# Patient Record
Sex: MALE | Employment: OTHER | URBAN - METROPOLITAN AREA
[De-identification: names, ages, dates, MRNs, and addresses within clinical notes are randomized per-mention and may not be internally consistent; named-entity substitution may affect disease eponyms.]

---

## 2017-08-07 ENCOUNTER — APPOINTMENT (OUTPATIENT)
Dept: GENERAL RADIOLOGY | Age: 72
End: 2017-08-07
Attending: EMERGENCY MEDICINE
Payer: MEDICARE

## 2017-08-07 ENCOUNTER — HOSPITAL ENCOUNTER (EMERGENCY)
Age: 72
Discharge: HOME | End: 2017-08-07
Attending: EMERGENCY MEDICINE
Payer: MEDICARE

## 2017-08-07 VITALS
TEMPERATURE: 97.8 F | HEART RATE: 99 BPM | WEIGHT: 235 LBS | DIASTOLIC BLOOD PRESSURE: 92 MMHG | OXYGEN SATURATION: 93 % | RESPIRATION RATE: 22 BRPM | HEIGHT: 71 IN | BODY MASS INDEX: 32.9 KG/M2 | SYSTOLIC BLOOD PRESSURE: 134 MMHG

## 2017-08-07 DIAGNOSIS — I50.9 ACUTE ON CHRONIC CONGESTIVE HEART FAILURE, UNSPECIFIED CONGESTIVE HEART FAILURE TYPE: Primary | ICD-10-CM

## 2017-08-07 DIAGNOSIS — R06.00 DYSPNEA, UNSPECIFIED TYPE: ICD-10-CM

## 2017-08-07 LAB
ALBUMIN SERPL BCP-MCNC: 4 G/DL (ref 3.4–5)
ALBUMIN/GLOB SERPL: 1.2 {RATIO} (ref 0.8–1.7)
ALP SERPL-CCNC: 64 U/L (ref 45–117)
ALT SERPL-CCNC: 70 U/L (ref 16–61)
ANION GAP BLD CALC-SCNC: 18 MMOL/L (ref 10–20)
AST SERPL W P-5'-P-CCNC: 32 U/L (ref 15–37)
BASOPHILS # BLD AUTO: 0.1 K/UL (ref 0–0.06)
BASOPHILS # BLD: 1 % (ref 0–2)
BILIRUB DIRECT SERPL-MCNC: 0.2 MG/DL (ref 0–0.2)
BILIRUB SERPL-MCNC: 0.7 MG/DL (ref 0.2–1)
BNP SERPL-MCNC: 2740 PG/ML (ref 0–900)
BUN BLD-MCNC: 17 MG/DL (ref 7–18)
CA-I BLD-MCNC: 1.2 MMOL/L (ref 1.12–1.32)
CHLORIDE BLD-SCNC: 103 MMOL/L (ref 100–108)
CO2 BLD-SCNC: 25 MMOL/L (ref 19–24)
CREAT UR-MCNC: 1.1 MG/DL (ref 0.6–1.3)
D DIMER PPP FEU-MCNC: 0.27 UG/ML(FEU)
DIFFERENTIAL METHOD BLD: ABNORMAL
EOSINOPHIL # BLD: 0.5 K/UL (ref 0–0.4)
EOSINOPHIL NFR BLD: 4 % (ref 0–5)
ERYTHROCYTE [DISTWIDTH] IN BLOOD BY AUTOMATED COUNT: 13.8 % (ref 11.6–14.5)
GLOBULIN SER CALC-MCNC: 3.4 G/DL (ref 2–4)
GLUCOSE BLD STRIP.AUTO-MCNC: 195 MG/DL (ref 74–106)
HCT VFR BLD AUTO: 47.6 % (ref 36–48)
HCT VFR BLD CALC: 50 % (ref 36–49)
HGB BLD-MCNC: 15.3 G/DL (ref 13–16)
HGB BLD-MCNC: 17 G/DL (ref 12–16)
INR PPP: 1.8 (ref 0.8–1.2)
LYMPHOCYTES # BLD AUTO: 15 % (ref 21–52)
LYMPHOCYTES # BLD: 1.8 K/UL (ref 0.9–3.6)
MAGNESIUM SERPL-MCNC: 2.2 MG/DL (ref 1.6–2.6)
MCH RBC QN AUTO: 28.7 PG (ref 24–34)
MCHC RBC AUTO-ENTMCNC: 32.1 G/DL (ref 31–37)
MCV RBC AUTO: 89.1 FL (ref 74–97)
MONOCYTES # BLD: 1.1 K/UL (ref 0.05–1.2)
MONOCYTES NFR BLD AUTO: 9 % (ref 3–10)
NEUTS SEG # BLD: 8.3 K/UL (ref 1.8–8)
NEUTS SEG NFR BLD AUTO: 71 % (ref 40–73)
PLATELET # BLD AUTO: 220 K/UL (ref 135–420)
PMV BLD AUTO: 12.1 FL (ref 9.2–11.8)
POTASSIUM BLD-SCNC: 3.6 MMOL/L (ref 3.5–5.5)
PROT SERPL-MCNC: 7.4 G/DL (ref 6.4–8.2)
PROTHROMBIN TIME: 20.3 SEC (ref 11.5–15.2)
RBC # BLD AUTO: 5.34 M/UL (ref 4.7–5.5)
SODIUM BLD-SCNC: 141 MMOL/L (ref 136–145)
TROPONIN I BLD-MCNC: <0.04 NG/ML (ref 0–0.08)
TROPONIN I BLD-MCNC: <0.04 NG/ML (ref 0–0.08)
WBC # BLD AUTO: 11.6 K/UL (ref 4.6–13.2)

## 2017-08-07 PROCEDURE — 71010 XR CHEST SNGL V: CPT

## 2017-08-07 PROCEDURE — 83880 ASSAY OF NATRIURETIC PEPTIDE: CPT | Performed by: EMERGENCY MEDICINE

## 2017-08-07 PROCEDURE — 80047 BASIC METABLC PNL IONIZED CA: CPT

## 2017-08-07 PROCEDURE — 85379 FIBRIN DEGRADATION QUANT: CPT | Performed by: EMERGENCY MEDICINE

## 2017-08-07 PROCEDURE — 85025 COMPLETE CBC W/AUTO DIFF WBC: CPT | Performed by: EMERGENCY MEDICINE

## 2017-08-07 PROCEDURE — 84484 ASSAY OF TROPONIN QUANT: CPT

## 2017-08-07 PROCEDURE — 85610 PROTHROMBIN TIME: CPT | Performed by: EMERGENCY MEDICINE

## 2017-08-07 PROCEDURE — 96374 THER/PROPH/DIAG INJ IV PUSH: CPT

## 2017-08-07 PROCEDURE — 83735 ASSAY OF MAGNESIUM: CPT | Performed by: EMERGENCY MEDICINE

## 2017-08-07 PROCEDURE — 99285 EMERGENCY DEPT VISIT HI MDM: CPT

## 2017-08-07 PROCEDURE — 74011250636 HC RX REV CODE- 250/636: Performed by: EMERGENCY MEDICINE

## 2017-08-07 PROCEDURE — 80076 HEPATIC FUNCTION PANEL: CPT | Performed by: EMERGENCY MEDICINE

## 2017-08-07 PROCEDURE — 93005 ELECTROCARDIOGRAM TRACING: CPT

## 2017-08-07 RX ORDER — FUROSEMIDE 10 MG/ML
20 INJECTION INTRAMUSCULAR; INTRAVENOUS
Status: COMPLETED | OUTPATIENT
Start: 2017-08-07 | End: 2017-08-07

## 2017-08-07 RX ORDER — METOPROLOL TARTRATE 5 MG/5ML
2.5 INJECTION INTRAVENOUS ONCE
Status: DISCONTINUED | OUTPATIENT
Start: 2017-08-07 | End: 2017-08-07

## 2017-08-07 RX ADMIN — FUROSEMIDE 20 MG: 10 INJECTION, SOLUTION INTRAVENOUS at 17:41

## 2017-08-07 NOTE — ED NOTES
Hour nursing rounds performed. Pt resting in bed with no signs of distress noted. Pt denied pain, SOB, distress, and or any discomfort at this time. Ensured pt's BP/ SPO2 and cardiac monitoring in proper place and on. Will continue to monitor pt.

## 2017-08-07 NOTE — ED NOTES
Krystle Anne is a 73 y.o. male that was discharged in stable condition. The patients diagnosis, condition and treatment were explained to  patient and aftercare instructions were given. The patient verbalized understanding. Patient armband removed and shredded.

## 2017-08-07 NOTE — ED TRIAGE NOTES
C/o pt states that he believes he is in congestive heart failure. Pt states that he does have afib and CHF.

## 2017-08-07 NOTE — ED PROVIDER NOTES
HPI Comments: 3:56 PM Angel Ragsdale is a 73 y.o. male with a history of CHF, DM, arthritis, and A-fib who presents to ED for the evaluation of SOB that he began this morning. Pt states that he has been panting PTA and is a little hoarse. He explains previously going on vacation doing excessive moving and running. Pt says that during his vacation he missed a few days of taking his furosemide and gained weight. He also says that he is always in A-fib. Pt denies CP. No other complaints, associated symptoms or modifying factors at this time. PCP: Lobo Brito MD      The history is provided by the patient. No  was used. Past Medical History:   Diagnosis Date    A-fib [facility])     Arthritis     Cancer ([facility] [address] 2011    Neck Lymphnodes removed    CHF [facility]congestive heart failure) [facility] [address]     DM (diabetes mellitus) ([facility] [address]     Hyperlipidemia        Past Surgical History:   Procedure Laterality Date    HX ORTHOPAEDIC      Lumbar spine    HX ORTHOPAEDIC      Rt Knee    HX OTHER SURGICAL      Lymphnode removal to neck         No family history on file. Social History     Social History    Marital status:      Spouse name: N/A    Number of children: N/A    Years of education: N/A     Occupational History    Not on file. Social History Main Topics    Smoking status: Not on file    Smokeless tobacco: Not on file    Alcohol use Not on file    Drug use: Not on file    Sexual activity: Not on file     Other Topics Concern    Not on file     Social History Narrative    No narrative on file         ALLERGIES: Review of patient's allergies indicates no known allergies. Review of Systems   Respiratory: Positive for shortness of breath. \"hoarse\"   Cardiovascular: Negative for chest pain. All other systems reviewed and are negative.       Vitals:    08/07/17 1547 08/07/17 1553   BP:  (!) 132/113   Pulse: (!) 105 (!) 106   Resp: 20 29   Temp:  97.8 °F (36.6 °C) SpO2: 96%    Weight:  106.6 kg (235 lb)   Height:  5' 11\" (1.803 m)            Physical Exam   Constitutional: He is oriented to person, place, and time. He appears well-developed and well-nourished. HENT:   Head: Normocephalic and atraumatic. Mouth/Throat: Oropharynx is clear and moist.   Eyes: Conjunctivae are normal. Pupils are equal, round, and reactive to light. No scleral icterus. Neck: Normal range of motion. Neck supple. No JVD present. No tracheal deviation present. Cardiovascular: Normal heart sounds. An irregularly irregular rhythm present. Tachycardia present. No murmur   Pulmonary/Chest: Effort normal and breath sounds normal. No respiratory distress. He has no wheezes. Abdominal: Soft. Bowel sounds are normal.   Musculoskeletal: Normal range of motion. 1+ pitting edema to bilateral ankles   Neurological: He is alert and oriented to person, place, and time. He has normal strength. Gait normal. GCS eye subscore is 4. GCS verbal subscore is 5. GCS motor subscore is 6. Skin: Skin is warm and dry. Psychiatric: He has a normal mood and affect. Nursing note and vitals reviewed. MDM  Number of Diagnoses or Management Options    ED Course     Patient presenting with dyspnea and edema, consistent with mild CHF exacerbation. Likely due to med and dietary noncompliance while on vacation. Oxygenating well on RA and ambulating without difficulty. No signs of pneumonia, ACS, or PE; trpn and d-dimer negative. Will continue his current med regimen and f/u with primary care and cardiology. States he is chronically in a fib.     Procedures      Vitals:  Patient Vitals for the past 12 hrs:   Temp Pulse Resp BP SpO2   08/07/17 1605 - - - - 97 %   08/07/17 1600 - 99 21 115/83 96 %   08/07/17 1553 97.8 °F (36.6 °C) (!) 106 29 (!) 132/113 -   08/07/17 1549 - (!) 102 19 132/82 98 %   08/07/17 1547 - (!) 105 20 - 96 %         Medications ordered:   Medications   metoprolol (LOPRESSOR) injection 2.5 mg (not administered)         Lab findings:  Recent Results (from the past 12 hour(s))   EKG, 12 LEAD, INITIAL    Collection Time: 08/07/17  3:51 PM   Result Value Ref Range    Ventricular Rate 116 BPM    Atrial Rate 89 BPM    QRS Duration 98 ms    Q-T Interval 332 ms    QTC Calculation (Bezet) 461 ms    Calculated R Axis 3 degrees    Calculated T Axis 65 degrees    Diagnosis       Atrial fibrillation with rapid ventricular response  Nonspecific ST and T wave abnormality , probably digitalis effect  Abnormal ECG  No previous ECGs available         EKG interpretation by ED Physician:  Atrial fib rate 116, no acute ST changes    X-Ray, CT or other radiology findings or impressions:  No results found. Progress notes, Consult notes or additional Procedure notes:   6:22 PM Consult:  Discussed care with Dr. Kristin Chaney discussion; including history of patients chief complaint, available diagnostic results, and treatment course. Discussed presentation and results agrees with plan for outpatient and continuing current medication regimen. Disposition:  Diagnosis: No diagnosis found. Disposition: Discharged     Follow-up Information     None           Patient's Medications   Start Taking    No medications on file   Continue Taking    CARVEDILOL (COREG) 25 MG TABLET    Take 25 mg by mouth two (2) times daily (with meals). CYANOCOBALAMIN (VITAMIN B-12) 1,000 MCG TABLET    Take 1,000 mcg by mouth daily. FUROSEMIDE (LASIX) 20 MG TABLET    Take  by mouth daily. HYDROXYCHLOROQUINE (PLAQUENIL) 200 MG TABLET    Take 200 mg by mouth daily. SITAGLIPTIN-METFORMIN (JANUMET)  MG PER TABLET    Take 1 Tab by mouth two (2) times daily (with meals). WARFARIN (COUMADIN) 5 MG TABLET    Take 5 mg by mouth daily. These Medications have changed    No medications on file   Stop Taking    ATORVASTATIN (LIPITOR) 20 MG TABLET    Take  by mouth daily.     HYDROCODONE-ACETAMINOPHEN (NORCO) 5-325 MG PER TABLET Take 1 Tab by mouth every four (4) hours as needed for Pain. Scribe Attestation:   Anette Apodaca acting as a scribe for and in the presence of Perlita Rascon MD August 07, 2017 at 4:12 PM     Signed by: Favio Hilario, August 07, 2017 at 4:12 PM     Provider Attestation:   I personally performed the services described in the documentation, reviewed the documentation, as recorded by the scribe in my presence, and it accurately and completely records my words and actions.      Reviewed and signed by:  Perlita Rascon MD

## 2017-08-07 NOTE — ED NOTES
Per Dr. Bar Kemp, hold the metoprolol at this time, if heart rate climbs to 110-120 give the Metoprolol. Pt's nurse notified at this time.

## 2017-08-07 NOTE — DISCHARGE INSTRUCTIONS
Continue your medications as prescribed. If you have worsening symptoms, trouble breathing, passing out, chest pain or any other worrying signs then return to the ER right away. Heart Failure: Care Instructions  Your Care Instructions    Heart failure occurs when your heart does not pump as much blood as the body needs. Failure does not mean that the heart has stopped pumping but rather that it is not pumping as well as it should. Over time, this causes fluid buildup in your lungs and other parts of your body. Fluid buildup can cause shortness of breath, fatigue, swollen ankles, and other problems. By taking medicines regularly, reducing sodium (salt) in your diet, checking your weight every day, and making lifestyle changes, you can feel better and live longer. Follow-up care is a key part of your treatment and safety. Be sure to make and go to all appointments, and call your doctor if you are having problems. It's also a good idea to know your test results and keep a list of the medicines you take. How can you care for yourself at home? Medicines  · Be safe with medicines. Take your medicines exactly as prescribed. Call your doctor if you think you are having a problem with your medicine. · Do not take any vitamins, over-the-counter medicine, or herbal products without talking to your doctor first. Mila Merigold not take ibuprofen (Advil or Motrin) and naproxen (Aleve) without talking to your doctor first. They could make your heart failure worse. · You may be taking some of the following medicine. ¨ Beta-blockers can slow heart rate, decrease blood pressure, and improve your condition. Taking a beta-blocker may lower your chance of needing to be hospitalized. ¨ Angiotensin-converting enzyme inhibitors (ACEIs) reduce the heart's workload, lower blood pressure, and reduce swelling. Taking an ACEI may lower your chance of needing to be hospitalized again. ¨ Angiotensin II receptor blockers (ARBs) work like ACEIs. Your doctor may prescribe them instead of ACEIs. ¨ Diuretics, also called water pills, reduce swelling. ¨ Potassium supplements replace this important mineral, which is sometimes lost with diuretics. ¨ Aspirin and other blood thinners prevent blood clots, which can cause a stroke or heart attack. You will get more details on the specific medicines your doctor prescribes. Diet  · Your doctor may suggest that you limit sodium to 2,000 milligrams (mg) a day or less. That is less than 1 teaspoon of salt a day, including all the salt you eat in cooking or in packaged foods. People get most of their sodium from processed foods. Fast food and restaurant meals also tend to be very high in sodium. · Ask your doctor how much liquid you can drink each day. You may have to limit liquids. Weight  · Weigh yourself without clothing at the same time each day. Record your weight. Call your doctor if you have a sudden weight gain, such as more than 2 to 3 pounds in a day or 5 pounds in a week. (Your doctor may suggest a different range of weight gain.) A sudden weight gain may mean that your heart failure is getting worse. Activity level  · Start light exercise (if your doctor says it is okay). Even if you can only do a small amount, exercise will help you get stronger, have more energy, and manage your weight and your stress. Walking is an easy way to get exercise. Start out by walking a little more than you did before. Bit by bit, increase the amount you walk. · When you exercise, watch for signs that your heart is working too hard. You are pushing yourself too hard if you cannot talk while you are exercising. If you become short of breath or dizzy or have chest pain, stop, sit down, and rest.  · If you feel \"wiped out\" the day after you exercise, walk slower or for a shorter distance until you can work up to a better pace. · Get enough rest at night.  Sleeping with 1 or 2 pillows under your upper body and head may help you breathe easier. Lifestyle changes  · Do not smoke. Smoking can make a heart condition worse. If you need help quitting, talk to your doctor about stop-smoking programs and medicines. These can increase your chances of quitting for good. Quitting smoking may be the most important step you can take to protect your heart. · Limit alcohol to 2 drinks a day for men and 1 drink a day for women. Too much alcohol can cause health problems. · Avoid getting sick from colds and the flu. Get a pneumococcal vaccine shot. If you have had one before, ask your doctor whether you need another dose. Get a flu shot each year. If you must be around people with colds or the flu, wash your hands often. When should you call for help? Call 911 if you have symptoms of sudden heart failure such as:  · You have severe trouble breathing. · You cough up pink, foamy mucus. · You have a new irregular or rapid heartbeat. Call your doctor now or seek immediate medical care if:  · You have new or increased shortness of breath. · You are dizzy or lightheaded, or you feel like you may faint. · You have sudden weight gain, such as more than 2 to 3 pounds in a day or 5 pounds in a week. (Your doctor may suggest a different range of weight gain.)  · You have increased swelling in your legs, ankles, or feet. · You are suddenly so tired or weak that you cannot do your usual activities. Watch closely for changes in your health, and be sure to contact your doctor if:  · You develop new symptoms. Where can you learn more? Go to http://sylvia-naima.info/. Enter J416 in the search box to learn more about \"Heart Failure: Care Instructions. \"  Current as of: April 3, 2017  Content Version: 11.3  © 9971-5797 [employer]. Care instructions adapted under license by [employer] (which disclaims liability or warranty for this information).  If you have questions about a medical condition or this instruction, always ask your healthcare professional. [place] [address] any warranty or liability for your use of this information.

## 2017-08-08 LAB
ATRIAL RATE: 89 BPM
CALCULATED R AXIS, ECG10: 3 DEGREES
CALCULATED T AXIS, ECG11: 65 DEGREES
DIAGNOSIS, 93000: NORMAL
Q-T INTERVAL, ECG07: 332 MS
QRS DURATION, ECG06: 98 MS
QTC CALCULATION (BEZET), ECG08: 461 MS
VENTRICULAR RATE, ECG03: 116 BPM

## 2019-08-23 ENCOUNTER — OFFICE VISIT (OUTPATIENT)
Dept: ORTHOPEDIC SURGERY | Age: 74
End: 2019-08-23

## 2019-08-23 VITALS
DIASTOLIC BLOOD PRESSURE: 81 MMHG | BODY MASS INDEX: 29.93 KG/M2 | SYSTOLIC BLOOD PRESSURE: 134 MMHG | WEIGHT: 214.6 LBS | RESPIRATION RATE: 15 BRPM | HEART RATE: 59 BPM

## 2019-08-23 DIAGNOSIS — S39.012A BACK STRAIN, INITIAL ENCOUNTER: Primary | ICD-10-CM

## 2019-08-23 DIAGNOSIS — M47.816 SPONDYLOSIS OF LUMBAR REGION WITHOUT MYELOPATHY OR RADICULOPATHY: ICD-10-CM

## 2019-08-23 RX ORDER — CYCLOBENZAPRINE HCL 5 MG
5-10 TABLET ORAL
Qty: 30 TAB | Refills: 1 | Status: SHIPPED | OUTPATIENT
Start: 2019-08-23 | End: 2021-04-27 | Stop reason: SDUPTHER

## 2019-08-23 RX ORDER — PREDNISONE 20 MG/1
TABLET ORAL
Qty: 20 TAB | Refills: 0 | Status: SHIPPED | OUTPATIENT
Start: 2019-08-23 | End: 2019-08-26 | Stop reason: SDUPTHER

## 2019-08-23 NOTE — PATIENT INSTRUCTIONS
Back Spasm: Care Instructions  Your Care Instructions  A back spasm is sudden tightness and pain in your back muscles. It may happen from overuse or an injury. Things like sleeping in an awkward way, bending, lifting, standing, or sitting can sometimes cause a spasm. But the cause isn't always clear. Home treatment includes using heat or ice, taking over-the-counter (OTC) pain medicines, and avoiding activities that may cause back pain. For a back spasm that doesn't get better with home care, your doctor may prescribe medicine. Treatments such as massage or manipulation may also help ease a back spasm. Your doctor may also suggest exercise or physical therapy to help improve strength and flexibility in your back muscles. In most cases, getting back to your normal activities is good for your back. Just make sure to avoid doing things that make your pain worse. Follow-up care is a key part of your treatment and safety. Be sure to make and go to all appointments, and call your doctor if you are having problems. It's also a good idea to know your test results and keep a list of the medicines you take. How can you care for yourself at home?   Heat, ice, and medicines    · To relieve pain, use heat or ice (whichever feels better) on the affected area. ? Put a warm water bottle, a heating pad set on low, or a warm cloth on your back. Put a thin cloth between the heating pad and your skin. Do not go to sleep with a heating pad on your skin. ? Try ice or a cold pack on the area for 10 to 20 minutes at a time. Put a thin cloth between the ice and your skin.     · For most back pain you can take over-the-counter pain medicine. Nonsteroidal anti-inflammatory drugs (NSAIDs) such as ibuprofen or naproxen seem to work best. But if you can't take NSAIDs you can try acetaminophen. Your doctor can prescribe stronger medicines if needed. Be safe with medicines.  Read and follow all instructions on the label.   THE [facility] positions and posture    · Sit or lie in positions that are most comfortable for you and that reduce pain. Try one of these positions when you lie down:  ? Lie on your back with your knees bent and supported by large pillows. ? Lie on the floor with your legs on the seat of a sofa or chair. ? Lie on your side with your knees and hips bent and a pillow between your legs. ? Lie on your stomach if it does not make pain worse.     · Do not sit up in bed. Avoid soft couches and twisted positions.     · Avoid bed rest after the first day of back pain. Bed rest can help relieve pain at first, but it delays healing. Continued rest without activity is usually not good for your back.     · If you must sit for long periods of time, take breaks from sitting. Change positions every 30 minutes. Get up and walk around, or lie in a comfortable position. Activity    · Take short walks several times a day. You can start with 5 to 10 minutes, 3 or 4 times a day, and work up to longer walks. Walk on level surfaces and avoid hills and stairs until your back starts to feel better.     · After your back spasm starts to feel better, try to stretch your muscles every day, especially before and after exercise and at bedtime. Regular stretching can help relax your muscles.     · To prevent future back pain, do exercises to stretch and strengthen your back and stomach. Learn to use good posture, safe lifting techniques, and other ways to move to help you avoid back pain. When should you call for help? Call 911 anytime you think you may need emergency care. For example, call if:    · You are unable to move an arm or a leg at all.   [facility] your doctor now or seek immediate medical care if:    · You have new or worse symptoms in your legs, belly, or buttocks. Symptoms may include:  ? Numbness or tingling. ? Weakness.   ? Pain.     · You lose bladder or bowel control.    Watch closely for changes in your health, and be sure to contact your doctor if:    · You have a fever, lose weight, or don't feel well.     · You do not get better as expected. Where can you learn more? Go to http://sylvia-naima.info/. Enter E232 in the search box to learn more about \"Back Spasm: Care Instructions. \"  Current as of: September 20, 2018  Content Version: 12.1  © 2924-5808 [employer]. Care instructions adapted under license by [employer] (which disclaims liability or warranty for this information). If you have questions about a medical condition or this instruction, always ask your healthcare professional. [place] [address] any warranty or liability for your use of this information.

## 2019-08-23 NOTE — PROGRESS NOTES
[address], [address]  [place]  Phone: [phone]  Fax: [phone]  NEW PATIENT  Patient: Akbar Jenkins                MRN: [id number]       SSN: [id number]  YOB: 1945        AGE: 69 y.o. SEX: male  Body mass index is 29.93 kg/m². PCP: Uma Ku MD  08/23/19    Chief Complaint   Patient presents with    Back Pain     NEW PATIENT     Akbar Jenkins is seen today in consultation at the request of his PCP for complaints of Back pain     HISTORY OF PRESENT ILLNESS:  Akbar Jenkins is a 69 y.o.  male with history of back pain after doing some heavy activity in his attic about 6 months ago. He reports muscle spasms. He has no radicular symptoms. Prior history of back problems: previous spinal surgery - for a HNP when he was 27 yrs old, he sought chiropractic care many years ago w/ benefit. After this recent episode he went to his PCP, he was given some Celebrex w/ benefit but it put him back in A-fib and he had to stop it. His pain is left-sided and arthritic in nature, worse in the morning. He has tried; PT-No,  Non-opioid medications Yes, and spinal injections No. Pain is aching and throbbing. Pain is worse with lifting, twisting and affects recreational activities. Pain is better with ice. Denies bladder/bowel dysfunction, saddle paresthesia, weakness, gait disturbance, or other neurological deficits. Denies chills, fever,night sweats, unexplained weight loss/weight gain, chest pain, sob or anxiety. Pt at this time desires to  continue with current care/proceed with medication evaluation/proceed with evaluation of back pain. Medications Has tried Celebrex and Tylenol. Can't have NSAIDs. On Prednisone 5mg daily. PMHx: A-Fib on Eliquis, DM, Tongue Ca, RA    RADIOGRAPHS  2V LS reviewed  Advanced degenerative changes      ASSESSMENT   Diagnoses and all orders for this visit:    1.  Back strain, initial encounter    2. Spondylosis of lumbar region without myelopathy or radiculopathy    Other orders  -     predniSONE (DELTASONE) 20 mg tablet; Take 3 tabs po x 3 days, then 2 tabs po x 3 days, then 1 tabs po x 3 days, then 1/2 tab po x 4 day  -     cyclobenzaprine (FLEXERIL) 5 mg tablet; Take 1-2 Tabs by mouth three (3) times daily as needed for Muscle Spasm(s). IMPRESSION AND PLAN:  This is a pt with an acute back strain six months ago that has gotten better and some advanced degenerative changes in his LS. His pain has gotten better over time. He is not interested in PT, HEP or injections. I will give some steroids to try and calm his arthritis and a mild muscle relaxer for his pain.    > Pt was given information on Flexeril   > Prednisone pack  > Very PRN Flexeril  > Mr. Catrachito Dean has a reminder for a \"due or due soon\" health maintenance. I have asked that he contact his primary care provider, Kitty Figueroa MD, for follow-up on this health maintenance.  > We have informed patient to notify us for immediate appointment if he has any worsening neurogical symptoms or if an emergency situation presents, then call 911  >  has been reviewed and is appropriate  > Pt will follow-up in as needed. Subjective    Smoking Status former smoker    Pain Scale: 2/10    Pain Assessment  8/23/2019   Location of Pain Back   Severity of Pain 2   Quality of Pain Aching   Frequency of Pain Intermittent   Aggravating Factors (No Data)   Aggravating Factors Comment just depends   Relieving Factors Rest         REVIEW OF SYSTEMS  Constitutional: Negative for fever, chills, or weight change. Respiratory: Negative for cough or shortness of breath. Cardiovascular: Negative for chest pain or palpitations. Gastrointestinal: Negative for incontinence, acid reflux, change in bowel habits, or constipation. Genitourinary: Negative for incontinence, dysuria and flank pain. Musculoskeletal: Positive for back pain.  See HPI.  Skin: Negative for rash. Neurological:no  radiculopathy. See HPI. Endo/Heme/Allergies: Negative. Psychiatric/Behavioral: Negative. PHYSICAL EXAMINATION  Visit Vitals  /81 (BP 1 Location: Left arm, BP Patient Position: Sitting)   Pulse (!) 59   Resp 15   Wt 214 lb 9.6 oz (97.3 kg)   BMI 29.93 kg/m²         Accompanied by self. Constitutional:  Well developed, well nourished, in no acute distress. Psychiatric: Affect and mood are appropriate. Integumentary: No rashes or abrasions noted on exposed areas. Cardiovascular/Peripheral Vascular: +2 radial & pedal pulses. No peripheral edema is noted. Lymphatic:  No evidence of lymphedema. No cervical lymphadenopathy. SPINE/MUSCULOSKELETAL EXAM     Lumbar spine:  No rash, ecchymosis, or gross obliquity. No fasciculations. No focal atrophy is noted. Range of motion is intact with flexion, extension, turning right, turning left. Tenderness to palpation L low back L4. No tenderness to palpation at the sciatic notch. SI joints non-tender. Trochanters non tender. Straight leg raise Negative    Sensation grossly intact to light touch. MOTOR:     Hip Flex Quads Hamstrings Ankle DF EHL Ankle PF   Right 5/5 5/5 5/5 5/5 5/5 5/5   Left 5/5 5/5 5/5 5/5 5/5 5/5       Ambulation without assistive device. FWB.    normal gait and station        PAST MEDICAL HISTORY   Past Medical History:   Diagnosis Date    [facility] [address]     Arthritis     Cancer ([facility] [address] 2011    Neck Lymphnodes removed    [facility]estive [facility] failure) [facility] [address]     DM [facility]diabetes mellitus) ([facility] [address]     Hyperlipidemia        Past Surgical History:   Procedure Laterality Date    HX ORTHOPAEDIC      Lumbar spine    HX ORTHOPAEDIC      Rt Knee    HX OTHER SURGICAL      Lymphnode removal to neck   .       MEDICATIONS      Current Outpatient Medications   Medication Sig Dispense Refill    apixaban (ELIQUIS) 5 mg tablet Eliquis 5 mg tablet      SITagliptin (JANUVIA) 100 mg tablet Januvia 100 mg tablet      predniSONE (DELTASONE) 20 mg tablet Take 3 tabs po x 3 days, then 2 tabs po x 3 days, then 1 tabs po x 3 days, then 1/2 tab po x 4 day 20 Tab 0    cyclobenzaprine (FLEXERIL) 5 mg tablet Take 1-2 Tabs by mouth three (3) times daily as needed for Muscle Spasm(s). 30 Tab 1    carvedilol (COREG) 25 mg tablet Take 25 mg by mouth two (2) times daily (with meals).  cyanocobalamin (VITAMIN B-12) 1,000 mcg tablet Take 1,000 mcg by mouth daily.  hydroxychloroquine (PLAQUENIL) 200 mg tablet Take 200 mg by mouth daily.  furosemide (LASIX) 20 mg tablet Take  by mouth daily.  sitaGLIPtin-metFORMIN (JANUMET)  mg per tablet Take 1 Tab by mouth two (2) times daily (with meals).  warfarin (COUMADIN) 5 mg tablet Take 5 mg by mouth daily. ALLERGIES  No Known Allergies       SOCIAL HISTORY    Social History     Socioeconomic History    Marital status: UNKNOWN     Spouse name: Not on file    Number of children: Not on file    Years of education: Not on file    Highest education level: Not on file   Occupational History    Not on file   Social Needs    Financial resource strain: Not on file    Food insecurity:     Worry: Not on file     Inability: Not on file    Transportation needs:     Medical: Not on file     Non-medical: Not on file   Tobacco Use    Smoking status: Former Smoker    Smokeless tobacco: Never Used   Substance and Sexual Activity    Alcohol use:  Yes     Alcohol/week: 3.0 standard drinks     Types: 3 Glasses of wine per week     Comment: daily    Drug use: Not on file    Sexual activity: Not on file   Lifestyle    Physical activity:     Days per week: Not on file     Minutes per session: Not on file    Stress: Not on file   Relationships    Social connections:     Talks on phone: Not on file     Gets together: Not on file     Attends Jewish service: Not on file     Active member of club or organization: Not on file     Attends meetings of clubs or organizations: Not on file     Relationship status: Not on file    Intimate partner violence:     Fear of current or ex partner: Not on file     Emotionally abused: Not on file     Physically abused: Not on file     Forced sexual activity: Not on file   Other Topics Concern    Not on file   Social History Narrative    Not on file       FAMILY HISTORY  No family history on file.       Maria D Art, NP

## 2019-08-26 ENCOUNTER — TELEPHONE (OUTPATIENT)
Dept: ORTHOPEDIC SURGERY | Age: 74
End: 2019-08-26

## 2019-08-26 RX ORDER — PREDNISONE 20 MG/1
TABLET ORAL
Qty: 20 TAB | Refills: 0 | Status: SHIPPED | OUTPATIENT
Start: 2019-08-26 | End: 2021-04-27 | Stop reason: ALTCHOICE

## 2019-08-26 NOTE — TELEPHONE ENCOUNTER
Please call pharmacy to see if they received this. Per CC, it was signed. Please apologize to the patient on our behalf  .

## 2019-08-26 NOTE — TELEPHONE ENCOUNTER
Per [facility] Rx was printed at our [facility] location. Per NP elizabeth Wheatley to send medication electronically. Rx sent. Returned call to patient, verified Name/, informed patient of above. Patient verbalized agreement/understanding. No further action required at this time.

## 2019-08-26 NOTE — TELEPHONE ENCOUNTER
Patient seen Fri 08/23/2019 by Sunday Henao. The prednisone DELTASONE) was not called in to [facility]. Patient upset as he needed this Friday. Please call prescription asap this morning to [facility]

## 2020-02-17 ENCOUNTER — HOSPITAL ENCOUNTER (OUTPATIENT)
Dept: LAB | Age: 75
Discharge: HOME | End: 2020-02-17
Payer: MEDICARE

## 2020-02-17 DIAGNOSIS — Z01.818 OTHER SPECIFIED PRE-OPERATIVE EXAMINATION: ICD-10-CM

## 2020-02-17 LAB
ANION GAP SERPL CALC-SCNC: 7 MMOL/L (ref 3–18)
BUN SERPL-MCNC: 16 MG/DL (ref 7–18)
BUN/CREAT SERPL: 16 (ref 12–20)
CALCIUM SERPL-MCNC: 9 MG/DL (ref 8.5–10.1)
CHLORIDE SERPL-SCNC: 107 MMOL/L (ref 100–111)
CO2 SERPL-SCNC: 29 MMOL/L (ref 21–32)
CREAT SERPL-MCNC: 0.98 MG/DL (ref 0.6–1.3)
GLUCOSE SERPL-MCNC: 127 MG/DL (ref 74–99)
POTASSIUM SERPL-SCNC: 3.3 MMOL/L (ref 3.5–5.5)
SODIUM SERPL-SCNC: 143 MMOL/L (ref 136–145)

## 2020-02-17 PROCEDURE — 36415 COLL VENOUS BLD VENIPUNCTURE: CPT

## 2020-02-17 PROCEDURE — 80048 BASIC METABOLIC PNL TOTAL CA: CPT

## 2020-02-17 PROCEDURE — 93005 ELECTROCARDIOGRAM TRACING: CPT

## 2020-02-18 LAB
ATRIAL RATE: 60 BPM
CALCULATED P AXIS, ECG09: 49 DEGREES
CALCULATED R AXIS, ECG10: -4 DEGREES
CALCULATED T AXIS, ECG11: 7 DEGREES
DIAGNOSIS, 93000: NORMAL
P-R INTERVAL, ECG05: 178 MS
Q-T INTERVAL, ECG07: 410 MS
QRS DURATION, ECG06: 100 MS
QTC CALCULATION (BEZET), ECG08: 410 MS
VENTRICULAR RATE, ECG03: 60 BPM

## 2020-11-06 ENCOUNTER — HOSPITAL ENCOUNTER (OUTPATIENT)
Dept: CT IMAGING | Age: 75
Discharge: HOME | End: 2020-11-06
Attending: FAMILY MEDICINE
Payer: MEDICARE

## 2020-11-06 ENCOUNTER — TRANSCRIBE ORDER (OUTPATIENT)
Dept: SCHEDULING | Age: 75
End: 2020-11-06

## 2020-11-06 DIAGNOSIS — I63.9 CEREBRAL INFARCTION (HCC): Primary | ICD-10-CM

## 2020-11-06 DIAGNOSIS — I63.9 CEREBRAL INFARCTION (HCC): ICD-10-CM

## 2020-11-06 PROCEDURE — 70450 CT HEAD/BRAIN W/O DYE: CPT

## 2021-04-27 ENCOUNTER — OFFICE VISIT (OUTPATIENT)
Dept: ORTHOPEDIC SURGERY | Age: 76
End: 2021-04-27
Payer: MEDICARE

## 2021-04-27 VITALS
WEIGHT: 195 LBS | TEMPERATURE: 98 F | HEART RATE: 73 BPM | HEIGHT: 71 IN | BODY MASS INDEX: 27.3 KG/M2 | OXYGEN SATURATION: 98 %

## 2021-04-27 DIAGNOSIS — M62.830 MUSCLE SPASM OF BACK: ICD-10-CM

## 2021-04-27 DIAGNOSIS — M47.816 SPONDYLOSIS OF LUMBAR REGION WITHOUT MYELOPATHY OR RADICULOPATHY: Primary | ICD-10-CM

## 2021-04-27 DIAGNOSIS — M54.6 CHRONIC RIGHT-SIDED THORACIC BACK PAIN: ICD-10-CM

## 2021-04-27 DIAGNOSIS — G89.29 CHRONIC RIGHT-SIDED THORACIC BACK PAIN: ICD-10-CM

## 2021-04-27 PROCEDURE — 3017F COLORECTAL CA SCREEN DOC REV: CPT | Performed by: NURSE PRACTITIONER

## 2021-04-27 PROCEDURE — G8419 CALC BMI OUT NRM PARAM NOF/U: HCPCS | Performed by: NURSE PRACTITIONER

## 2021-04-27 PROCEDURE — G8432 DEP SCR NOT DOC, RNG: HCPCS | Performed by: NURSE PRACTITIONER

## 2021-04-27 PROCEDURE — 99213 OFFICE O/P EST LOW 20 MIN: CPT | Performed by: NURSE PRACTITIONER

## 2021-04-27 PROCEDURE — G8427 DOCREV CUR MEDS BY ELIG CLIN: HCPCS | Performed by: NURSE PRACTITIONER

## 2021-04-27 PROCEDURE — G8536 NO DOC ELDER MAL SCRN: HCPCS | Performed by: NURSE PRACTITIONER

## 2021-04-27 PROCEDURE — 1101F PT FALLS ASSESS-DOCD LE1/YR: CPT | Performed by: NURSE PRACTITIONER

## 2021-04-27 RX ORDER — ACETAMINOPHEN 500 MG
1000 TABLET ORAL 3 TIMES DAILY
COMMUNITY

## 2021-04-27 RX ORDER — SENNOSIDES 8.6 MG/1
TABLET ORAL
COMMUNITY

## 2021-04-27 RX ORDER — ONDANSETRON 4 MG/1
TABLET, FILM COATED ORAL
COMMUNITY
Start: 2021-04-05

## 2021-04-27 RX ORDER — CETIRIZINE HCL 10 MG
10 TABLET ORAL AS NEEDED
COMMUNITY
Start: 2021-01-25

## 2021-04-27 RX ORDER — CYCLOBENZAPRINE HCL 5 MG
5-10 TABLET ORAL
Qty: 45 TAB | Refills: 1 | Status: SHIPPED | OUTPATIENT
Start: 2021-04-27

## 2021-04-27 RX ORDER — POLYETHYLENE GLYCOL 3350 17 G/17G
17 POWDER, FOR SOLUTION ORAL DAILY
COMMUNITY
Start: 2021-01-21

## 2021-04-27 RX ORDER — ASPIRIN 81 MG/1
TABLET ORAL
COMMUNITY
Start: 2020-07-16

## 2021-04-27 RX ORDER — OMEPRAZOLE 20 MG/1
20 CAPSULE, DELAYED RELEASE ORAL DAILY
COMMUNITY
Start: 2020-07-10

## 2021-04-27 RX ORDER — ATORVASTATIN CALCIUM 40 MG/1
TABLET, FILM COATED ORAL
COMMUNITY

## 2021-04-27 NOTE — PROGRESS NOTES
[address]  [address], [address]  [place] [address]  Phone: [phone]  Fax: [phone]    Bushra Reed  : 1945  PCP: Hardik Gipson MD    PROGRESS NOTE    HISTORY OF PRESENT ILLNESS:  Chief Complaint   Patient presents with    Back Pain     Stacy Freire is a 70 y.o.  male with history of back pain after doing some heavy activity in his attic about 2 years ago. He was seen last in 2019 with GENARO Wheatley as a new patient. Prior history of back problems: previous spinal surgery - for a HNP when he was 27 yrs old, he sought chiropractic care many years ago w/ benefit. After this recent episode he went to his PCP, he was given some Celebrex w/ benefit but it put him back in A-fib and he had to stop it. He was given a prednisone taper and PRN flexeril. Today, he states he is here for some pain in his left thoracic spine. He is describing a muscle spasms. He states it will radiate from his back and will come and go. He denies any burning pains. The only way to get rid of it is when he goes to sleep. Denies bladder/bowel dysfunction, saddle paresthesia, weakness, gait disturbance, or other neurological deficit. Pt at this time desires to  continue with current care/proceed with medication evaluation. Of note, he is undergoing chemo and radiation     Medications Has tried Celebrex and Tylenol. Can't have NSAIDs. On Prednisone 5mg daily.      PMHx: A-Fib on Eliquis, DM, Tongue Ca, RA     RADIOGRAPHS  2V LS reviewed  Advanced degenerative changes       ASSESSMENT  70 y.o. male with thoracic muscle pain. Diagnoses and all orders for this visit:    1. Spondylosis of lumbar region without myelopathy or radiculopathy  -     cyclobenzaprine (FLEXERIL) 5 mg tablet; Take 1-2 Tabs by mouth two (2) times daily as needed for Muscle Spasm(s). -     AMB SUPPLY ORDER  -     REFERRAL TO PHYSICAL THERAPY    2.  Chronic right-sided thoracic back pain  - cyclobenzaprine (FLEXERIL) 5 mg tablet; Take 1-2 Tabs by mouth two (2) times daily as needed for Muscle Spasm(s). -     AMB SUPPLY ORDER  -     REFERRAL TO PHYSICAL THERAPY    3. Muscle spasm of back  -     cyclobenzaprine (FLEXERIL) 5 mg tablet; Take 1-2 Tabs by mouth two (2) times daily as needed for Muscle Spasm(s). -     AMB SUPPLY ORDER  -     REFERRAL TO PHYSICAL THERAPY         IMPRESSION/PLAN    1) Pt was given information on back exercises. 2) referral to PT  3) trial of flexeril PRN, he has taken this in the past with benefit   4) ESTIM for muscle spasms  4) Mr. Danielle Ghotra has a reminder for a \"due or due soon\" health maintenance. I have asked that he contact his primary care provider, April Serra MD, for follow-up on this health maintenance. 5) We have informed patient to notify us for immediate appointment if he has any worsening neurogical symptoms or if an emergency situation presents, then call 911  5) Pt will follow-up in PRN. Risks and benefits of ongoing therapy have been reviewed with the patient.  is appropriate. No pain behaviors. Denies thoughts of harming self or others. Pt has a good risk to benefit ratio which allows the pt to function in a home environment without side effects. PAST MEDICAL HISTORY  Past Medical History:   Diagnosis Date    [facility] [address]     Arthritis     Cancer ([facility] [address] 2011    Neck Lymphnodes removed    CH[facility]congestive heart failure) ([facility] [address]     DM (diabetes mellitus) ([facility] [address]     Hyperlipidemia         MEDICATIONS  Current Outpatient Medications   Medication Sig Dispense Refill    acetaminophen (TYLENOL) 500 mg tablet Take 1,000 mg by mouth three (3) times daily.  aspirin delayed-release 81 mg tablet aspirin 81 mg tablet,delayed release   take 1 tablet by mouth once daily      atorvastatin (LIPITOR) 40 mg tablet atorvastatin 40 mg tablet   TAKE 1 TABLET BY MOUTH  EVERY DAY      cetirizine (ZYRTEC) 10 mg tablet Take 10 mg by mouth as needed.       omeprazole (PRILOSEC) 20 mg capsule Take 20 mg by mouth daily.  ondansetron hcl (ZOFRAN) 4 mg tablet take 1 tablet by mouth three to four times a day      polyethylene glycol (MIRALAX) 17 gram/dose powder Take 17 g by mouth daily.  senna (SENOKOT) 8.6 mg tablet Senna Lax 8.6 mg tablet   take 1 tablet by mouth twice a day if needed for constipation      cyclobenzaprine (FLEXERIL) 5 mg tablet Take 1-2 Tabs by mouth two (2) times daily as needed for Muscle Spasm(s). 45 Tab 1    apixaban (ELIQUIS) 5 mg tablet Take 5 mg by mouth two (2) times a day.  SITagliptin (JANUVIA) 100 mg tablet Take 100 mg by mouth daily.  carvedilol (COREG) 25 mg tablet Take 25 mg by mouth two (2) times daily (with meals).  cyanocobalamin (VITAMIN B-12) 1,000 mcg tablet Take 1,000 mcg by mouth daily.  hydroxychloroquine (PLAQUENIL) 200 mg tablet Take 200 mg by mouth two (2) times a day.  furosemide (LASIX) 20 mg tablet Take 20 mg by mouth daily.  warfarin (COUMADIN) 5 mg tablet Take 5 mg by mouth daily.  sitaGLIPtin-metFORMIN (JANUMET)  mg per tablet Take 1 Tab by mouth two (2) times daily (with meals). ALLERGIES  No Known Allergies    SOCIAL HISTORY    Social History     Socioeconomic History    Marital status: UNKNOWN     Spouse name: Not on file    Number of children: Not on file    Years of education: Not on file    Highest education level: Not on file   Occupational History    Not on file   Social Needs    Financial resource strain: Not on file    Food insecurity     Worry: Not on file     Inability: Not on file    Transportation needs     Medical: Not on file     Non-medical: Not on file   Tobacco Use    Smoking status: Former Smoker    Smokeless tobacco: Never Used   Substance and Sexual Activity    Alcohol use:  Yes     Alcohol/week: 3.0 standard drinks     Types: 3 Glasses of wine per week     Comment: daily    Drug use: Not on file    Sexual activity: Not on file Lifestyle    Physical activity     Days per week: Not on file     Minutes per session: Not on file    Stress: Not on file   Relationships    Social connections     Talks on phone: Not on file     Gets together: Not on file     Attends Restorationist service: Not on file     Active member of club or organization: Not on file     Attends meetings of clubs or organizations: Not on file     Relationship status: Not on file    Intimate partner violence     Fear of current or ex partner: Not on file     Emotionally abused: Not on file     Physically abused: Not on file     Forced sexual activity: Not on file   Other Topics Concern    Not on file   Social History Narrative    Not on file       SUBJECTIVE        Pain Scale: 0 - No pain/10    Pain Assessment  4/27/2021   Location of Pain Back   Severity of Pain 0   Quality of Pain Dull; Maryann Lanes; Other (Comment)   Quality of Pain Comment radiating   Duration of Pain A few hours   Frequency of Pain Intermittent   Aggravating Factors Other (Comment)   Aggravating Factors Comment doing anything   Limiting Behavior Yes   Relieving Factors Other (Comment)   Relieving Factors Comment get a good night's sleep   Result of Injury No       Accompanied by self. REVIEW OF SYSTEMS  ROS    Constitutional: Negative for fever, chills, or weight change. Respiratory: Negative for cough or shortness of breath. Cardiovascular: Negative for chest pain or palpitations. Gastrointestinal: Negative for acid reflux, change in bowel habits, or constipation. Genitourinary: Negative for incontinence, dysuria and flank pain. Musculoskeletal: Positive for back pain. Skin: Negative for rash. Neurological: Negative for headaches, dizziness, or numbness. Endo/Heme/Allergies: Negative . Psychiatric/Behavioral: Negative.        PHYSICAL EXAMINATION  Visit Vitals  Pulse 73   Temp 98 °F (36.7 °C) (Tympanic)   Ht 5' 11\" (1.803 m)   Wt 195 lb (88.5 kg)   SpO2 98% Comment: RA   BMI 27.20 kg/m² Constitutional: Well developed,  well nourished,  awake, alert, and in no acute distress. Neurological:  Sensation to light touch is intact. Psychiatric: Affect and mood are appropriate. Integumentary: No rashes or abrasions noted on exposed areas,  warm, dry and intact. Cardiovascular/Peripheral Vascular:  No peripheral edema is noted. Lymphatic:  No evidence of lymphedema. No cervical lymphadenopathy. SPINE/MUSCULOSKELETAL EXAM    Lumbar spine:  No rash, ecchymosis, or gross obliquity. No fasciculations. No focal atrophy is noted. Range of motion is intact with flexion, extension, turning right, turning left. Tenderness to palpation left thoracic. No tenderness to palpation at the sciatic notch. SI joints non-tender. Trochanters non tender.       Straight leg raise Negative     Sensation grossly intact to light touch.        MOTOR:      Hip Flex Quads Hamstrings Ankle DF EHL Ankle PF   Right 5/5 5/5 5/5 5/5 5/5 5/5   Left 5/5 5/5 5/5 5/5 5/5 5/5         Ambulation without assistive device. FWB.      normal gait and station         Gigi Carmen NP

## 2021-04-27 NOTE — PROGRESS NOTES
Carlotta Berger. presents today for   Chief Complaint   Patient presents with    Back Pain       Is someone accompanying this pt? no    Is the patient using any DME equipment during OV? no    Depression Screening:  3 most recent PHQ Screens 4/27/2021   Little interest or pleasure in doing things Not at all   Feeling down, depressed, irritable, or hopeless Not at all   Total Score PHQ 2 0       Abuse Screening:  Abuse Screening Questionnaire 4/27/2021   Do you ever feel afraid of your partner? N   Are you in a relationship with someone who physically or mentally threatens you? N   Is it safe for you to go home? Y       Fall Risk  Fall Risk Assessment, last 12 mths 4/27/2021   Able to walk? Yes   Fall in past 12 months? 0   Do you feel unsteady? 0   Are you worried about falling 0       Coordination of Care:  1. Have you been to the ER, urgent care clinic since your last visit? Yes. Pt had some ER visits for his mouth. Notes in care everywhere. Hospitalized since your last visit? Yes. Pt states that he was kept in the hospital for a day or two with a mouth surgery that he had    2. Have you seen or consulted any other health care providers outside of the [address] [place] [address] since your last visit? Yes, Dr. Dolly Vincent for mouth surgery. Include any pap smears or colon screening.  no

## 2021-04-29 ENCOUNTER — DOCUMENTATION ONLY (OUTPATIENT)
Dept: ORTHOPEDIC SURGERY | Age: 76
End: 2021-04-29

## 2021-04-29 NOTE — PROGRESS NOTES
The pt's order for estim, last office note, demographics and copy of insurance card were faxed over to [employer]. A fax confirmation was received and they will contact the pt. The pt has the contact number to [employer] in case he does not hear from them.